# Patient Record
Sex: FEMALE | Race: BLACK OR AFRICAN AMERICAN | Employment: FULL TIME | URBAN - METROPOLITAN AREA
[De-identification: names, ages, dates, MRNs, and addresses within clinical notes are randomized per-mention and may not be internally consistent; named-entity substitution may affect disease eponyms.]

---

## 2021-03-06 ENCOUNTER — HOSPITAL ENCOUNTER (EMERGENCY)
Age: 26
Discharge: HOME | End: 2021-03-06
Payer: COMMERCIAL

## 2021-03-06 VITALS
OXYGEN SATURATION: 100 % | DIASTOLIC BLOOD PRESSURE: 75 MMHG | HEART RATE: 107 BPM | HEIGHT: 65 IN | TEMPERATURE: 98 F | SYSTOLIC BLOOD PRESSURE: 134 MMHG | RESPIRATION RATE: 16 BRPM | BODY MASS INDEX: 25.83 KG/M2 | WEIGHT: 155 LBS

## 2021-03-06 DIAGNOSIS — R22.0 FACIAL SWELLING: Primary | ICD-10-CM

## 2021-03-06 DIAGNOSIS — O26.899 ABDOMINAL CRAMPING AFFECTING PREGNANCY: ICD-10-CM

## 2021-03-06 DIAGNOSIS — R10.9 ABDOMINAL CRAMPING AFFECTING PREGNANCY: ICD-10-CM

## 2021-03-06 DIAGNOSIS — O23.41 URINARY TRACT INFECTION IN MOTHER DURING FIRST TRIMESTER OF PREGNANCY: ICD-10-CM

## 2021-03-06 LAB
ANION GAP SERPL CALC-SCNC: 8 MMOL/L (ref 5–15)
APPEARANCE UR: ABNORMAL
BACTERIA URNS QL MICRO: ABNORMAL /HPF
BACTERIA URNS QL MICRO: ABNORMAL /HPF
BASOPHILS # BLD: 0 K/UL (ref 0–0.1)
BASOPHILS NFR BLD: 0 % (ref 0–1)
BILIRUB UR QL: NEGATIVE
BUN SERPL-MCNC: 10 MG/DL (ref 6–20)
BUN/CREAT SERPL: 17 (ref 12–20)
CA-I BLD-MCNC: 9.1 MG/DL (ref 8.5–10.1)
CHLORIDE SERPL-SCNC: 105 MMOL/L (ref 97–108)
CO2 SERPL-SCNC: 24 MMOL/L (ref 21–32)
COLOR UR: ABNORMAL
CREAT SERPL-MCNC: 0.6 MG/DL (ref 0.55–1.02)
DIFFERENTIAL METHOD BLD: ABNORMAL
EOSINOPHIL # BLD: 0 K/UL (ref 0–0.4)
EOSINOPHIL NFR BLD: 0 % (ref 0–7)
ERYTHROCYTE [DISTWIDTH] IN BLOOD BY AUTOMATED COUNT: 17.2 % (ref 11.5–14.5)
GLUCOSE SERPL-MCNC: 94 MG/DL (ref 65–100)
GLUCOSE UR STRIP.AUTO-MCNC: NEGATIVE MG/DL
HCG SERPL-ACNC: 4122 MIU/ML (ref 0–6)
HCT VFR BLD AUTO: 33.2 % (ref 35–47)
HGB BLD-MCNC: 10.7 G/DL (ref 11.5–16)
HGB UR QL STRIP: NEGATIVE
IMM GRANULOCYTES # BLD AUTO: 0 K/UL (ref 0–0.04)
IMM GRANULOCYTES NFR BLD AUTO: 0 % (ref 0–0.5)
KETONES UR QL STRIP.AUTO: NEGATIVE MG/DL
KOH PREP SPEC: NORMAL
KOH PREP SPEC: NORMAL
LEUKOCYTE ESTERASE UR QL STRIP.AUTO: ABNORMAL
LYMPHOCYTES # BLD: 1.9 K/UL (ref 0.8–3.5)
LYMPHOCYTES NFR BLD: 36 % (ref 12–49)
MCH RBC QN AUTO: 23.9 PG (ref 26–34)
MCHC RBC AUTO-ENTMCNC: 32.2 G/DL (ref 30–36.5)
MCV RBC AUTO: 74.1 FL (ref 80–99)
MONOCYTES # BLD: 0.7 K/UL (ref 0–1)
MONOCYTES NFR BLD: 13 % (ref 5–13)
MUCOUS THREADS URNS QL MICRO: ABNORMAL /LPF
NEUTS SEG # BLD: 2.6 K/UL (ref 1.8–8)
NEUTS SEG NFR BLD: 51 % (ref 32–75)
NITRITE UR QL STRIP.AUTO: NEGATIVE
PH UR STRIP: 7 [PH] (ref 5–8)
PLATELET # BLD AUTO: 339 K/UL (ref 150–400)
PMV BLD AUTO: 9.5 FL (ref 8.9–12.9)
POTASSIUM SERPL-SCNC: 3.4 MMOL/L (ref 3.5–5.1)
PROT UR STRIP-MCNC: NEGATIVE MG/DL
RBC # BLD AUTO: 4.48 M/UL (ref 3.8–5.2)
RBC #/AREA URNS HPF: ABNORMAL /HPF (ref 0–5)
RBC #/AREA URNS HPF: ABNORMAL /HPF (ref 0–5)
SODIUM SERPL-SCNC: 137 MMOL/L (ref 136–145)
SP GR UR REFRACTOMETRY: 1.03 (ref 1–1.03)
SPECIAL REQUESTS,SREQ: NORMAL
UROBILINOGEN UR QL STRIP.AUTO: 0.1 EU/DL (ref 0.1–1)
WBC # BLD AUTO: 5.3 K/UL (ref 3.6–11)
WBC URNS QL MICRO: ABNORMAL /HPF (ref 0–4)
WBC URNS QL MICRO: ABNORMAL /HPF (ref 0–4)

## 2021-03-06 PROCEDURE — 87591 N.GONORRHOEAE DNA AMP PROB: CPT

## 2021-03-06 PROCEDURE — 87210 SMEAR WET MOUNT SALINE/INK: CPT

## 2021-03-06 PROCEDURE — 74011250637 HC RX REV CODE- 250/637: Performed by: NURSE PRACTITIONER

## 2021-03-06 PROCEDURE — 74011250636 HC RX REV CODE- 250/636: Performed by: NURSE PRACTITIONER

## 2021-03-06 PROCEDURE — 84702 CHORIONIC GONADOTROPIN TEST: CPT

## 2021-03-06 PROCEDURE — 80048 BASIC METABOLIC PNL TOTAL CA: CPT

## 2021-03-06 PROCEDURE — 85025 COMPLETE CBC W/AUTO DIFF WBC: CPT

## 2021-03-06 PROCEDURE — 99284 EMERGENCY DEPT VISIT MOD MDM: CPT

## 2021-03-06 PROCEDURE — 36415 COLL VENOUS BLD VENIPUNCTURE: CPT

## 2021-03-06 PROCEDURE — 81001 URINALYSIS AUTO W/SCOPE: CPT

## 2021-03-06 RX ORDER — CEPHALEXIN 500 MG/1
500 CAPSULE ORAL 2 TIMES DAILY
Qty: 14 CAP | Refills: 0 | Status: SHIPPED | OUTPATIENT
Start: 2021-03-06 | End: 2021-03-13

## 2021-03-06 RX ORDER — ACETAMINOPHEN 325 MG/1
650 TABLET ORAL ONCE
Status: COMPLETED | OUTPATIENT
Start: 2021-03-06 | End: 2021-03-06

## 2021-03-06 RX ADMIN — SODIUM CHLORIDE, POTASSIUM CHLORIDE, SODIUM LACTATE AND CALCIUM CHLORIDE 1000 ML: 600; 310; 30; 20 INJECTION, SOLUTION INTRAVENOUS at 19:26

## 2021-03-06 RX ADMIN — ACETAMINOPHEN 650 MG: 325 TABLET, FILM COATED ORAL at 19:26

## 2021-03-06 NOTE — ED TRIAGE NOTES
[facility] 17 pt stated that the left side of her jaw is swollen and when she eats it hurts; c/o toothache ; pt also stated that she is pregnant and woke up this am with some cramping and spotting LMP 1/26

## 2021-03-07 NOTE — DISCHARGE INSTRUCTIONS
Thank you! Thank you for allowing me to care for you in the emergency department. I sincerely hope that you are satisfied with your visit today. It is my goal to provide you with excellent care. Below you will find a list of your labs and imaging from your visit today. Should you have any questions regarding these results please do not hesitate to call the emergency department. Labs -     Recent Results (from the past 12 hour(s))   URINALYSIS W/ RFLX MICROSCOPIC    Collection Time: 03/06/21  7:30 PM   Result Value Ref Range    Color Yellow/Straw      Appearance Turbid (A) Clear      Specific gravity 1.027 1.003 - 1.030      pH (UA) 7.0 5.0 - 8.0      Protein Negative Negative mg/dL    Glucose Negative Negative mg/dL    Ketone Negative Negative mg/dL    Bilirubin Negative Negative      Blood Negative Negative      Urobilinogen 0.1 0.1 - 1.0 EU/dL    Nitrites Negative Negative      Leukocyte Esterase Small (A) Negative      WBC 20-50 0 - 4 /hpf    RBC 0-5 0 - 5 /hpf    Bacteria 3+ (A) Negative /hpf    Mucus 3+ /lpf   URINE MICROSCOPIC    Collection Time: 03/06/21  7:30 PM   Result Value Ref Range    WBC PENDING /hpf    RBC PENDING /hpf    Bacteria PENDING /hpf   CBC WITH AUTOMATED DIFF    Collection Time: 03/06/21  7:32 PM   Result Value Ref Range    WBC 5.3 3.6 - 11.0 K/uL    RBC 4.48 3.80 - 5.20 M/uL    HGB 10.7 (L) 11.5 - 16.0 g/dL    HCT 33.2 (L) 35.0 - 47.0 %    MCV 74.1 (L) 80.0 - 99.0 FL    MCH 23.9 (L) 26.0 - 34.0 PG    MCHC 32.2 30.0 - 36.5 g/dL    RDW 17.2 (H) 11.5 - 14.5 %    PLATELET 778 068 - 301 K/uL    MPV 9.5 8.9 - 12.9 FL    NEUTROPHILS 51 32 - 75 %    LYMPHOCYTES 36 12 - 49 %    MONOCYTES 13 5 - 13 %    EOSINOPHILS 0 0 - 7 %    BASOPHILS 0 0 - 1 %    IMMATURE GRANULOCYTES 0 0.0 - 0.5 %    ABS. NEUTROPHILS 2.6 1.8 - 8.0 K/UL    ABS. LYMPHOCYTES 1.9 0.8 - 3.5 K/UL    ABS. MONOCYTES 0.7 0.0 - 1.0 K/UL    ABS. EOSINOPHILS 0.0 0.0 - 0.4 K/UL    ABS. BASOPHILS 0.0 0.0 - 0.1 K/UL    ABS. IMM. GRANS. 0.0 0.00 - 0.04 K/UL    DF AUTOMATED     METABOLIC PANEL, BASIC    Collection Time: 03/06/21  7:32 PM   Result Value Ref Range    Sodium 137 136 - 145 mmol/L    Potassium 3.4 (L) 3.5 - 5.1 mmol/L    Chloride 105 97 - 108 mmol/L    CO2 24 21 - 32 mmol/L    Anion gap 8 5 - 15 mmol/L    Glucose 94 65 - 100 mg/dL    BUN 10 6 - 20 mg/dL    Creatinine 0.60 0.55 - 1.02 mg/dL    BUN/Creatinine ratio 17 12 - 20      GFR est AA >60 >60 ml/min/1.73m2    GFR est non-AA >60 >60 ml/min/1.73m2    Calcium 9.1 8.5 - 10.1 mg/dL   BETA HCG, QT    Collection Time: 03/06/21  7:32 PM   Result Value Ref Range    Beta HCG, QT 4,122.0 (H) 0 - 6 mIU/mL       Radiologic Studies -   No orders to display     CT Results  (Last 48 hours)      None          CXR Results  (Last 48 hours)      None               If you feel that you have not received excellent quality care or timely care, please ask to speak to the nurse manager. Please choose us in the future for your continued health care needs. ------------------------------------------------------------------------------------------------------------  The exam and treatment you received in the Emergency Department were for an urgent problem and are not intended as complete care. It is important that you follow-up with a doctor, nurse practitioner, or physician assistant to:  (1) confirm your diagnosis,  (2) re-evaluation of changes in your illness and treatment, and  (3) for ongoing care. If your symptoms become worse or you do not improve as expected and you are unable to reach your usual health care provider, you should return to the Emergency Department. We are available 24 hours a day. Please take your discharge instructions with you when you go to your follow-up appointment. If you have any problem arranging a follow-up appointment, contact the Emergency Department immediately.     If a prescription has been provided, please have it filled as soon as possible to prevent a delay in treatment. Read the entire medication instruction sheet provided to you by the pharmacy. If you have any questions or reservations about taking the medication due to side effects or interactions with other medications, please call your primary care physician or contact the ER to speak with the charge nurse. Make an appointment with your family doctor or the physician you were referred to for follow-up of this visit as instructed on your discharge paperwork, as this is a mandatory follow-up. Return to the ER if you are unable to be seen or if you are unable to be seen in a timely manner. If you have any problem arranging the follow-up visit, contact the Emergency Department immediately.

## 2021-03-07 NOTE — ED PROVIDER NOTES
EMERGENCY DEPARTMENT HISTORY AND PHYSICAL EXAM      Date: 3/6/2021  Patient Name: Lizy Rose      History of Presenting Illness     Chief Complaint   Patient presents with    Dental Pain    Pregnancy Problem       History Provided By: Patient    HPI: Lizy Rose, 27 y.o. female with a past medical history significant pregnant presents to the ED with cc of swelling to left side of face and pain with eating and opening mouth. Reports symptoms presented this morning upon waking up reports pain is 4/10 are better with movement and palpation patient also reports being pregnant G2, P0 1 SAB last menstrual cycle January 26. She reports being unsure of how far along she is however developed scant vaginal bleeding this morning with whipping And abdominal cramping bilateral she denies any burning frequency urgency with urination denies any injury or trauma denies any use of panty liner or pad this time managed by GYN Dr. Bhumika Rojas at [facility] for an appointment on the 23rd. There are no other complaints, changes, or physical findings at this time. PCP: Other, MD Prakash    Current Outpatient Medications   Medication Sig Dispense Refill    cephALEXin (Keflex) 500 mg capsule Take 1 Cap by mouth two (2) times a day for 7 days. 14 Cap 0    prenatal multivit-ca-min-fe-fa (Prenatal Vitamin) tab Take 1 Tab by mouth daily for 90 days. 80 Tab 0       Past History     Past Medical History:  No past medical history on file. Past Surgical History:  No past surgical history on file. Family History:  No family history on file. Social History:  Social History     Tobacco Use    Smoking status: Not on file   Substance Use Topics    Alcohol use: Not on file    Drug use: Not on file       Allergies:  No Known Allergies      Review of Systems     Review of Systems   Constitutional: Negative for chills and fever. HENT: Positive for facial swelling. Negative for trouble swallowing. Respiratory: Negative for cough and shortness of breath. Cardiovascular: Negative for chest pain. Gastrointestinal: Positive for abdominal pain. Cramping   Genitourinary: Positive for vaginal bleeding. Scant red bleeding       Physical Exam     Physical Exam  Exam conducted with a chaperone present. Constitutional:       General: She is not in acute distress. Appearance: Normal appearance. She is normal weight. She is not ill-appearing or toxic-appearing. HENT:      Head: Normocephalic. Jaw: Tenderness and swelling present. No trismus. Mouth/Throat:      Mouth: Mucous membranes are moist.      Dentition: Normal dentition. No dental tenderness, gingival swelling, dental caries or dental abscesses. Eyes:      Extraocular Movements: Extraocular movements intact. Neck:      Musculoskeletal: Normal range of motion and neck supple. Cardiovascular:      Rate and Rhythm: Normal rate and regular rhythm. Pulses: Normal pulses. Heart sounds: Normal heart sounds. Pulmonary:      Effort: Pulmonary effort is normal. No respiratory distress. Breath sounds: Normal breath sounds. No wheezing or rhonchi. Abdominal:      General: Bowel sounds are normal.      Palpations: Abdomen is soft. Tenderness: There is no abdominal tenderness. There is no right CVA tenderness or left CVA tenderness. Genitourinary:     Exam position: Lithotomy position. Labia:         Right: No tenderness or lesion. Left: No tenderness or lesion. Vagina: No vaginal discharge, erythema or bleeding. Cervix: No cervical bleeding. Adnexa:         Right: No tenderness. Left: No tenderness. Comments: No bleeding noted  Musculoskeletal: Normal range of motion. Skin:     General: Skin is warm and dry. Capillary Refill: Capillary refill takes less than 2 seconds. Neurological:      Mental Status: She is alert and oriented to person, place, and time. Lab and Diagnostic Study Results     Labs -  Results for Conrad Velez (MRN [id number]) as of 3/7/2021 18:41   Ref. Range 3/6/2021 19:30 3/6/2021 19:30 3/6/2021 19:32   WBC Latest Ref Range: 3.6 - 11.0 K/uL   5.3   RBC Latest Ref Range: 3.80 - 5.20 M/uL   4.48   HGB Latest Ref Range: 11.5 - 16.0 g/dL   10.7 (L)   HCT Latest Ref Range: 35.0 - 47.0 %   33.2 (L)   MCV Latest Ref Range: 80.0 - 99.0 FL   74.1 (L)   MCH Latest Ref Range: 26.0 - 34.0 PG   23.9 (L)   MCHC Latest Ref Range: 30.0 - 36.5 g/dL   32.2   RDW Latest Ref Range: 11.5 - 14.5 %   17.2 (H)   PLATELET Latest Ref Range: 150 - 400 K/uL   339   MPV Latest Ref Range: 8.9 - 12.9 FL   9.5   NEUTROPHILS Latest Ref Range: 32 - 75 %   51   LYMPHOCYTES Latest Ref Range: 12 - 49 %   36   MONOCYTES Latest Ref Range: 5 - 13 %   13   EOSINOPHILS Latest Ref Range: 0 - 7 %   0   BASOPHILS Latest Ref Range: 0 - 1 %   0   IMMATURE GRANULOCYTES Latest Ref Range: 0.0 - 0.5 %   0   DF Latest Units:     AUTOMATED   ABS. NEUTROPHILS Latest Ref Range: 1.8 - 8.0 K/UL   2.6   ABS. IMM. GRANS. Latest Ref Range: 0.00 - 0.04 K/UL   0.0   ABS. LYMPHOCYTES Latest Ref Range: 0.8 - 3.5 K/UL   1.9   ABS. MONOCYTES Latest Ref Range: 0.0 - 1.0 K/UL   0.7   ABS. EOSINOPHILS Latest Ref Range: 0.0 - 0.4 K/UL   0.0   ABS.  BASOPHILS Latest Ref Range: 0.0 - 0.1 K/UL   0.0   Color Latest Units:   Yellow/Straw     Appearance Latest Ref Range: Clear   Turbid (A)     Specific gravity Latest Ref Range: 1.003 - 1.030   1.027     pH (UA) Latest Ref Range: 5.0 - 8.0   7.0     Protein Latest Ref Range: Negative mg/dL Negative     Glucose Latest Ref Range: Negative mg/dL Negative     Ketone Latest Ref Range: Negative mg/dL Negative     Blood Latest Ref Range: Negative   Negative     Bilirubin Latest Ref Range: Negative   Negative     Urobilinogen Latest Ref Range: 0.1 - 1.0 EU/dL 0.1     Nitrites Latest Ref Range: Negative   Negative     Leukocyte Esterase Latest Ref Range: Negative   Small (A) Mucus Latest Units: /lpf 3+     WBC Latest Ref Range: 0 - 4 /hpf 20-50 20-50    RBC Latest Ref Range: 0 - 5 /hpf 0-5 0-4    Bacteria Latest Ref Range: Negative /hpf 3+ (A) Moderate (A)    Sodium Latest Ref Range: 136 - 145 mmol/L   137   Potassium Latest Ref Range: 3.5 - 5.1 mmol/L   3.4 (L)   Chloride Latest Ref Range: 97 - 108 mmol/L   105   CO2 Latest Ref Range: 21 - 32 mmol/L   24   Anion gap Latest Ref Range: 5 - 15 mmol/L   8   Glucose Latest Ref Range: 65 - 100 mg/dL   94   BUN Latest Ref Range: 6 - 20 mg/dL   10   Creatinine Latest Ref Range: 0.55 - 1.02 mg/dL   0.60   BUN/Creatinine ratio Latest Ref Range: 12 - 20     17   Calcium Latest Ref Range: 8.5 - 10.1 mg/dL   9.1   GFR est non-AA Latest Ref Range: >60 ml/min/1.73m2   >60   GFR est AA Latest Ref Range: >60 ml/min/1.73m2   >60   Beta HCG, QT Latest Ref Range: 0 - 6 mIU/mL   4,122.0 (H)     Radiologic Studies -   [unfilled]  CT Results  (Last 48 hours)    None        CXR Results  (Last 48 hours)    None          Medical Decision Making and ED Course   - I am the first and primary provider for this patient AND AM THE PRIMARY PROVIDER OF RECORD. - I reviewed the vital signs, available nursing notes, past medical history, past surgical history, family history and social history. - Initial assessment performed. The patients presenting problems have been discussed, and the staff are in agreement with the care plan formulated and outlined with them. I have encouraged them to ask questions as they arise throughout their visit. Vital Signs-Reviewed the patient's vital signs. No data found. The patient presents with facial swelling with a differential diagnosis of cellulitis, abscess, dental problem    Vaginal bleeding, implantation bleeding, threatened miscarriage. ED Course:              Provider Notes (Medical Decision Making):   Mild trace edema noted to left side of face.   No dental abnormalities, tenderness, carriers, abscess, gingival swelling. Tenderness with palpation to left side of face. No trismus able to swallow secretions without any type of distress. No erythema, warmth, drainage noted to site. WBCs within normal limits CMP normal UA with small leuks and WBCs noted will treat patient for UTI with use of Keflex which should also treat facial swelling and pain. Patient currently pregnant on exam no vaginal bleeding noted denies any abdominal tenderness no concern for ectopic pregnancy. Patient has appointment with Kaylan Parra on March 23. Deferred STD testings at this time stable time of discharge patient advised of use of prenatals follow-up with GI      Consultations:       Consultations:         Procedures and [facility], NP        Disposition     Disposition:     Discharged      DISCHARGE PLAN:  1. There are no discharge medications for this patient. 2.   Follow-up Information     Follow up With Specialties Details Why [facility]    Dr. Dominick Rhodes at [facility] for women   Keep scheduled appointment for March 23         3. Return to ED if worse   4. Discharge Medication List as of 3/6/2021  9:47 PM          Diagnosis     Clinical Impression:   1. Facial swelling    2. Urinary tract infection in mother during first trimester of pregnancy    3. Abdominal cramping affecting pregnancy        Attestations:    Claudean Bouillon, NP    Please note that this dictation was completed with [email or web address], the computer voice recognition software. Quite often unanticipated grammatical, syntax, homophones, and other interpretive errors are inadvertently transcribed by the computer software. Please disregard these errors. Please excuse any errors that have escaped final proofreading. Thank you.

## 2021-03-12 LAB
C TRACH DNA SPEC QL NAA+PROBE: NEGATIVE
N GONORRHOEA DNA SPEC QL NAA+PROBE: NEGATIVE
SAMPLE TYPE: NORMAL
SERVICE CMNT-IMP: NORMAL
SPECIMEN SOURCE: NORMAL